# Patient Record
Sex: FEMALE | Race: WHITE | NOT HISPANIC OR LATINO | Employment: UNEMPLOYED | URBAN - METROPOLITAN AREA
[De-identification: names, ages, dates, MRNs, and addresses within clinical notes are randomized per-mention and may not be internally consistent; named-entity substitution may affect disease eponyms.]

---

## 2017-02-08 ENCOUNTER — ALLSCRIPTS OFFICE VISIT (OUTPATIENT)
Dept: OTHER | Facility: OTHER | Age: 62
End: 2017-02-08

## 2017-04-07 ENCOUNTER — ALLSCRIPTS OFFICE VISIT (OUTPATIENT)
Dept: OTHER | Facility: OTHER | Age: 62
End: 2017-04-07

## 2017-04-07 DIAGNOSIS — E78.00 PURE HYPERCHOLESTEROLEMIA: ICD-10-CM

## 2017-06-01 ENCOUNTER — ALLSCRIPTS OFFICE VISIT (OUTPATIENT)
Dept: OTHER | Facility: OTHER | Age: 62
End: 2017-06-01

## 2017-10-07 ENCOUNTER — HOSPITAL ENCOUNTER (EMERGENCY)
Facility: HOSPITAL | Age: 62
Discharge: HOME | End: 2017-10-07
Payer: COMMERCIAL

## 2017-10-07 VITALS
DIASTOLIC BLOOD PRESSURE: 91 MMHG | WEIGHT: 260 LBS | RESPIRATION RATE: 18 BRPM | BODY MASS INDEX: 43.32 KG/M2 | TEMPERATURE: 98.8 F | SYSTOLIC BLOOD PRESSURE: 188 MMHG | OXYGEN SATURATION: 97 % | HEIGHT: 65 IN | HEART RATE: 87 BPM

## 2017-10-07 DIAGNOSIS — T78.40XA ALLERGIC REACTION, INITIAL ENCOUNTER: Primary | ICD-10-CM

## 2017-10-07 PROCEDURE — 99282 EMERGENCY DEPT VISIT SF MDM: CPT

## 2017-10-07 RX ORDER — DIPHENHYDRAMINE HCL 25 MG
25 TABLET ORAL ONCE
Status: COMPLETED | OUTPATIENT
Start: 2017-10-07 | End: 2017-10-07

## 2017-10-07 RX ORDER — PREDNISONE 20 MG/1
40 TABLET ORAL DAILY
Qty: 8 TABLET | Refills: 0 | Status: SHIPPED | OUTPATIENT
Start: 2017-10-07 | End: 2017-10-11

## 2017-10-07 RX ORDER — FAMOTIDINE 20 MG/1
20 TABLET, FILM COATED ORAL ONCE
Status: COMPLETED | OUTPATIENT
Start: 2017-10-07 | End: 2017-10-07

## 2017-10-07 RX ORDER — PREDNISONE 20 MG/1
60 TABLET ORAL ONCE
Status: COMPLETED | OUTPATIENT
Start: 2017-10-07 | End: 2017-10-07

## 2017-10-07 RX ADMIN — PREDNISONE 60 MG: 20 TABLET ORAL at 16:08

## 2017-10-07 RX ADMIN — DIPHENHYDRAMINE HCL 25 MG: 25 TABLET ORAL at 16:08

## 2017-10-07 RX ADMIN — FAMOTIDINE 20 MG: 20 TABLET ORAL at 16:08

## 2017-10-07 NOTE — ED PROVIDER NOTES
History  Chief Complaint   Patient presents with    Bee Sting     pt stung at 9am on right middle finger, c/o swelling, numbness at the location, throbbing pain     Patient is a 49-year-old female presenting today with a bee sting to the right middle finger that occurred at 9:00 a m  this morning  Has noted some right hand swelling  Has been taking Benadryl  Noted some pruritus however no hives and no difficulty breathing  Otherwise feeling well without any other complaints  Eating and drinking without difficulty  Denies discoloration, rash, chest pain, nausea, vomiting, abdominal pain, shortness of breath, wheezing  Differential includes but is not limited to allergic reaction, cellulitis, foreign body, abscess  Prior to Admission Medications   Prescriptions Last Dose Informant Patient Reported?  Taking?   celecoxib (CeleBREX) 200 mg capsule 10/7/2017 at Unknown time  Yes Yes   Sig: Take 200 mg by mouth as needed for mild pain   clopidogrel (PLAVIX) 75 mg tablet 10/7/2017 at Unknown time  Yes Yes   Sig: Take 75 mg by mouth every morning   doxycycline hyclate (VIBRAMYCIN) 100 mg capsule 10/7/2017 at Unknown time  Yes Yes   Sig: Take 100 mg by mouth as needed   famotidine (PEPCID) 20 mg tablet 10/7/2017 at Unknown time  Yes Yes   Sig: Take 20 mg by mouth every morning   quinapril (ACCUPRIL) 10 mg tablet 10/7/2017 at Unknown time  Yes Yes   Sig: Take 10 mg by mouth every morning   sertraline (ZOLOFT) 25 mg tablet 10/7/2017 at Unknown time  Yes Yes   Sig: Take 50 mg by mouth every morning      Facility-Administered Medications: None       Past Medical History:   Diagnosis Date    Anxiety     Asthma     seasonally    Coronary artery disease     x1    Diabetes mellitus ([facility] [address] )     borderline diet controlled    GERD (gastroesophageal reflux disease)     Headache     History of transfusion     Hyperlipidemia     Hypertension     Rosacea     Sleep apnea     wears CPAP       Past Surgical History: Procedure Laterality Date    COSMETIC SURGERY      tummy tuck    COSMETIC SURGERY      breast lift    COSMETIC SURGERY      excesss skin removal       History reviewed  No pertinent family history  I have reviewed and agree with the history as documented  Social History   Substance Use Topics    Smoking status: Never Smoker    Smokeless tobacco: Never Used    Alcohol use Yes      Comment: rarely        Review of Systems   Constitutional: Negative  Negative for activity change, fatigue and fever  HENT: Negative  Eyes: Negative  Respiratory: Negative  Cardiovascular: Negative  Gastrointestinal: Negative  Genitourinary: Negative  Musculoskeletal: Positive for joint swelling  Negative for arthralgias, back pain, gait problem, myalgias, neck pain and neck stiffness  Skin: Negative  Neurological: Negative  Negative for tremors, weakness and numbness  All other systems reviewed and are negative  Physical Exam  ED Triage Vitals [10/07/17 1531]   Temperature Pulse Respirations Blood Pressure SpO2   98 8 °F (37 1 °C) 87 18 (!) 188/91 97 %      Temp Source Heart Rate Source Patient Position - Orthostatic VS BP Location FiO2 (%)   Oral Monitor Sitting Right arm --      Pain Score       8           Physical Exam   Constitutional: She is oriented to person, place, and time  She appears well-developed and well-nourished  HENT:   Head: Normocephalic and atraumatic  Right Ear: External ear normal    Left Ear: External ear normal    Nose: Nose normal    Mouth/Throat: Oropharynx is clear and moist    Eyes: Conjunctivae and EOM are normal  Pupils are equal, round, and reactive to light  Neck: Normal range of motion  Neck supple  Cardiovascular: Normal rate, regular rhythm, normal heart sounds and intact distal pulses  Exam reveals no gallop and no friction rub  No murmur heard    Pulmonary/Chest: Effort normal and breath sounds normal    spo2 is 97% indicating adequate oxygenation  Abdominal: Soft  Bowel sounds are normal    Neurological: She is alert and oriented to person, place, and time  neurovascular exam intact  Strength 5/5  Skin: Skin is warm and dry  Capillary refill takes less than 2 seconds  Nursing note and vitals reviewed  ED Medications  Medications   predniSONE tablet 60 mg (not administered)   diphenhydrAMINE (BENADRYL) tablet 25 mg (not administered)   famotidine (PEPCID) tablet 20 mg (not administered)       Diagnostic Studies  Labs Reviewed - No data to display    No orders to display       Procedures  Procedures      Phone Contacts  ED Phone Contact    ED Course  ED Course                                MDM  Number of Diagnoses or Management Options  Allergic reaction, initial encounter:   Diagnosis management comments: Will treat for allergic reaction with prednisone and Benadryl and Pepcid  Strict return precautions for any worsening symptoms  Neurovascular exam intact  No evidence of anaphylaxis at this time  Patient verbalizes understanding and agrees with the above assessment and plan  Amount and/or Complexity of Data Reviewed  Review and summarize past medical records: yes  Independent visualization of images, tracings, or specimens: yes      CritCare Time    Disposition  Final diagnoses: Allergic reaction, initial encounter     ED Disposition     ED Disposition Condition Comment    Discharge  Eleonora Lyons discharge to home/self care      Condition at discharge: Good        Follow-up Information     Follow up With Specialties Details Why Contact Info Additional [phone] Emergency Department Emergency Medicine Go to If symptoms worsen such as difficulty breathing, throat closing, difficulty swallowing, hives  [address] [facility]  [phone] [facility] ED, [facility], [address] [facility], [place]  Schedule an appointment as soon as possible for a visit As needed [address] [place] [address]  [phone]           Patient's Medications   Discharge Prescriptions    PREDNISONE 20 MG TABLET    Take 2 tablets by mouth daily for 4 days       Start Date: 10/7/2017 End Date: 10/11/2017       Order Dose: 40 mg       Quantity: 8 tablet    Refills: 0     No discharge procedures on file      ED Provider  Electronically Signed by       Jami Denton PA-C  10/07/17 9893

## 2017-10-07 NOTE — DISCHARGE INSTRUCTIONS
General Allergic Reaction   WHAT YOU NEED TO KNOW:   An allergic reaction is your body's response to an allergen  Allergens include medicines, food, insect stings, animal dander, mold, latex, chemicals, and dust mites  Pollen from trees, grass, and weeds can also cause an allergic reaction  DISCHARGE INSTRUCTIONS:   Return to the emergency department if:   · You have a skin rash, hives, swelling, or itching that gets worse  · You have trouble breathing, shortness of breath, wheezing, or coughing  · Your throat tightens, or your lips or tongue swell  · You have trouble swallowing or speaking  · You have dizziness, lightheadedness, fainting, or confusion  · You have nausea, vomiting, diarrhea, or abdominal cramps  · You have chest pain or tightness  Contact your healthcare provider if:   · You have questions or concerns about your condition or care  Medicines:   · Medicines  may be given to relieve certain allergy symptoms such as itching, sneezing, and swelling  You may take them as a pill or use drops in your nose or eyes  Topical treatments may be given to put directly on your skin to help decrease itching or swelling  · Take your medicine as directed  Contact your healthcare provider if you think your medicine is not helping or if you have side effects  Tell him of her if you are allergic to any medicine  Keep a list of the medicines, vitamins, and herbs you take  Include the amounts, and when and why you take them  Bring the list or the pill bottles to follow-up visits  Carry your medicine list with you in case of an emergency  Follow up with your healthcare provider as directed:  Write down your questions so you remember to ask them during your visits  Self-care:   · Avoid the allergen  that you think may have caused your allergic reaction  · Use cold compresses  on your skin or eyes if they were affected by the allergic reaction   Cold compresses may help to soothe your skin or eyes  · Rinse your nasal passages  with a saline solution  Daily rinsing may help clear your nose of allergens  · Do not smoke  Your allergy symptoms may decrease if you are not around smoke  Nicotine and other chemicals in cigarettes and cigars can also cause lung damage  Ask your healthcare provider for information if you currently smoke and need help to quit  E-cigarettes or smokeless tobacco still contain nicotine  Talk to your healthcare provider before you use these products  © 2017 [facility] Information is for End User's use only and may not be sold, redistributed or otherwise used for commercial purposes  All illustrations and images included in CareNotes® are the copyrighted property of [employer]  or Sanjiv Nuñez  The above information is an  only  It is not intended as medical advice for individual conditions or treatments  Talk to your doctor, nurse or pharmacist before following any medical regimen to see if it is safe and effective for you

## 2017-11-01 ENCOUNTER — CONSULTATION (OUTPATIENT)
Dept: URBAN - METROPOLITAN AREA CLINIC 52 | Facility: CLINIC | Age: 62
End: 2017-11-01

## 2017-11-01 VITALS — DIASTOLIC BLOOD PRESSURE: 83 MMHG | SYSTOLIC BLOOD PRESSURE: 127 MMHG | HEIGHT: 55 IN

## 2017-11-01 DIAGNOSIS — H35.363: ICD-10-CM

## 2017-11-01 DIAGNOSIS — H35.371: ICD-10-CM

## 2017-11-01 DIAGNOSIS — H40.033: ICD-10-CM

## 2017-11-01 DIAGNOSIS — H43.812: ICD-10-CM

## 2017-11-01 DIAGNOSIS — E11.9: ICD-10-CM

## 2017-11-01 PROCEDURE — 92020 GONIOSCOPY: CPT

## 2017-11-01 PROCEDURE — 4040F PNEUMOC VAC/ADMIN/RCVD: CPT | Mod: 8P

## 2017-11-01 PROCEDURE — 1036F TOBACCO NON-USER: CPT

## 2017-11-01 PROCEDURE — G8427 DOCREV CUR MEDS BY ELIG CLIN: HCPCS

## 2017-11-01 PROCEDURE — 99244 OFF/OP CNSLTJ NEW/EST MOD 40: CPT

## 2017-11-01 PROCEDURE — 92225 OPHTHALMOSCOPY (INITIAL): CPT

## 2017-11-01 ASSESSMENT — VISUAL ACUITY
OD_SC: 20/25-2
OD_PH: 20/20-1
OD_CC: 20/20
OS_SC: 20/25-2
OS_CC: 20/20

## 2017-11-01 ASSESSMENT — TONOMETRY
OD_IOP_MMHG: 18
OS_IOP_MMHG: 16

## 2017-11-12 ENCOUNTER — GENERIC CONVERSION - ENCOUNTER (OUTPATIENT)
Dept: OTHER | Facility: OTHER | Age: 62
End: 2017-11-12

## 2017-11-13 ENCOUNTER — ALLSCRIPTS OFFICE VISIT (OUTPATIENT)
Dept: OTHER | Facility: OTHER | Age: 62
End: 2017-11-13

## 2017-11-13 ENCOUNTER — GENERIC CONVERSION - ENCOUNTER (OUTPATIENT)
Dept: OTHER | Facility: OTHER | Age: 62
End: 2017-11-13

## 2017-11-22 ENCOUNTER — FOLLOW UP (OUTPATIENT)
Dept: URBAN - METROPOLITAN AREA CLINIC 52 | Facility: CLINIC | Age: 62
End: 2017-11-22

## 2017-11-22 DIAGNOSIS — H43.812: ICD-10-CM

## 2017-11-22 PROCEDURE — G8427 DOCREV CUR MEDS BY ELIG CLIN: HCPCS

## 2017-11-22 PROCEDURE — 1036F TOBACCO NON-USER: CPT

## 2017-11-22 PROCEDURE — 4040F PNEUMOC VAC/ADMIN/RCVD: CPT | Mod: 8P

## 2017-11-22 PROCEDURE — 92226 OPHTHALMOSCOPY (SUB): CPT

## 2017-11-22 PROCEDURE — 92012 INTRM OPH EXAM EST PATIENT: CPT

## 2017-11-22 ASSESSMENT — TONOMETRY
OS_IOP_MMHG: 10
OD_IOP_MMHG: 16

## 2017-11-22 ASSESSMENT — VISUAL ACUITY
OD_SC: 20/25
OD_PH: 20/20
OS_SC: 20/25

## 2018-01-09 NOTE — RESULT NOTES
Verified Results  (1) COMPREHENSIVE METABOLIC PANEL [id number] 80:06TF [employer]     Test Name Result Flag Reference   SODIUM 138  134-144   POTASSIUM 4 1  3 5-5 2   CHLORIDE 99     CARBON DIOXIDE 24  18-29   ANION GAP (CALC) 1 5  1 2-2 2   BLOOD UREA NITROGEN 16  8-27   CREATININE 0 81  0 57-1 00   CALCIUM 9 1  8 7-10 3   BILI, TOTAL 0 4  0 0-1 2   ALK PHOSPHATAS 64     ALT (SGPT) 12  0-32   AST(SGOT) 14  0-40   ALBUMIN 4 0  3 6-4 8   TOTAL PROTEIN 6 6  6 0-8 5   eGFR  90  >59   eGFR Non- 78  >59   GLUCOSE FASTING 143 H 65-99     (1) LIPID PANEL, FASTING [id number] 12:00AM [employer]     Test Name Result Flag Reference   CHOLESTEROL 230 H 100-199   HDL,DIRECT 46  >39   LDL CHOLESTEROL CALCULATED 113 H 0-99   TRIGLYCERIDES 355 H 0-149   VLDL,CALCULATED 71 H 5-40

## 2018-01-12 VITALS — RESPIRATION RATE: 16 BRPM | WEIGHT: 259 LBS | BODY MASS INDEX: 43.15 KG/M2 | HEART RATE: 64 BPM | HEIGHT: 65 IN

## 2018-01-13 VITALS
WEIGHT: 259.31 LBS | OXYGEN SATURATION: 98 % | SYSTOLIC BLOOD PRESSURE: 132 MMHG | HEART RATE: 64 BPM | BODY MASS INDEX: 43.2 KG/M2 | HEIGHT: 65 IN | DIASTOLIC BLOOD PRESSURE: 76 MMHG

## 2018-01-13 VITALS
HEIGHT: 65 IN | BODY MASS INDEX: 41.65 KG/M2 | DIASTOLIC BLOOD PRESSURE: 84 MMHG | RESPIRATION RATE: 17 BRPM | WEIGHT: 250 LBS | HEART RATE: 70 BPM | SYSTOLIC BLOOD PRESSURE: 145 MMHG

## 2018-01-22 VITALS
DIASTOLIC BLOOD PRESSURE: 72 MMHG | HEART RATE: 60 BPM | WEIGHT: 267 LBS | HEIGHT: 65 IN | SYSTOLIC BLOOD PRESSURE: 146 MMHG | OXYGEN SATURATION: 95 % | BODY MASS INDEX: 44.48 KG/M2

## 2018-07-20 ENCOUNTER — TELEPHONE (OUTPATIENT)
Dept: CARDIOLOGY CLINIC | Facility: CLINIC | Age: 63
End: 2018-07-20

## 2018-07-20 NOTE — TELEPHONE ENCOUNTER
----- Message from Davonte Feliz MD sent at 7/19/2018  6:51 PM EDT -----  Patient's cholesterol and triglycerides are very high  She is high risk for reoccurrence of coronary artery disease  She need to take care of her high cholesterol and triglyceride  Change her diet  Is she taking her cholesterol medication  ? Please call her

## 2018-07-25 NOTE — TELEPHONE ENCOUNTER
Patient is no longer with our practice  She states that she now sees someone in [place] whom she sees on Tuesday